# Patient Record
(demographics unavailable — no encounter records)

---

## 2024-10-30 NOTE — ASSESSMENT
[FreeTextEntry1] : 6 year girl with history that meets DSM-V criteria for diagnosis of Attention Deficit Hyperactivity Disorder-Inattentive type. I discussed behavioral modification techniques includin. Preferential seating in front of classroom and near teacher. Should be away from sources of distraction (i.e. bulletin boards, open windows/doors, facing other students) 2. Verbal, auditory or visual reminders [(i.e. different colored blocks to indicate plenty of time (green), time [facility] done (yellow) or nearing end of time(red] 3. Extra time to complete tests and projects 4. Testing in a separate room with few if any other students to reduce distraction 5. 1:1 assistance in form of paraprofessional, speech therapist and/or SEIT to help with focus 6. Homework should be completed in a quiet place without visual distraction. Recommend scheduled breaks to help alleviate the tendency to become distracted 7. Neuropsychology evaluation to r/o specific learning disorder/anxiety 8. Medication management may be indicated in the future.

## 2024-10-30 NOTE — BIRTH HISTORY
[At Term] : at term [place] [Normal Vaginal Route] : by normal vaginal route [None] : there were no delivery complications [Speech & Motor Delay] : patient has speech and motor delay  [Physical Therapy] : physical therapy [Occupational Therapy] : occupational therapy [Speech Therapy] : speech therapy [Feeding Therapy] : feeding therapy  [Age Appropriate] : age appropriate developmental milestones not met [FreeTextEntry6] : birth history not exactly known, adopted when she was 5months. [FreeTextEntry5] : hydrocephalus as an infant

## 2024-10-30 NOTE — HISTORY OF PRESENT ILLNESS
[FreeTextEntry1] : Minna presents to the office for evaluation of ADHD. She is currently in 2rd grade at [employer]. She is in an ICT classroom and has an IEP for an unspecified learning disorder. Academically, she is failing all subjects. She struggles in math and reading comprehension. She is unable to sit still for long periods of time. She is easily distracted in the classroom by her classmates and surroundings. She tends to rush through her tests and assignments and makes careless mistakes. She may have to read a question multiple times before she can answer it.  She loses focus when she is not directly engaged. Her desk is disorganized. Her backpack is disorganized. She often forgets important assignments, books, or personal belongings in school.   At home, Sugey needs extra time for homework and assignments. She needs frequent redirection from mom to complete tasks. She tends to have outbursts when she does not understand something or does not want to do something. She tends to write letters and numbers backwards. Homework takes longer than it needs to. Getting ready for school in the morning takes a long time. She needs prompting and reminding to get ready. Her room is disorganized.  She does not do chores when asked once but needs to be asked multiple times before she complies. She can be impatient and cannot wait in line for a long time. In conversation, she often forgets what she wants to say. She enjoys gymnastics. Her  has to redirect her to stay focused on tasks often.

## 2025-01-06 NOTE — REASON FOR VISIT
[Follow-Up Evaluation] : a follow-up evaluation for [ADHD] : ADHD [Patient] : patient [Parents] : parents

## 2025-01-06 NOTE — ASSESSMENT
[FreeTextEntry1] : 8 yo with hx of ADHD. Behavioral accommodations to be made in school includin. Preferential seating in front of classroom and near teacher. Should be away from sources of distraction (i.e. bulletin boards, open windows/doors, facing other students) 2. Verbal, auditory or visual reminders [(i.e. different colored blocks to indicate plenty of time (green), time [facility] done (yellow) or nearing end of time(red] 3. Extra time to complete tests and projects 4. Testing in a separate room with few if any other students to reduce distraction 5. 1:1 assistance in form of paraprofessional, speech therapist and/or SEIT to help with focus  504 forms will be completed. - medication management may be indicated in the future.

## 2025-01-06 NOTE — HISTORY OF PRESENT ILLNESS
[FreeTextEntry1] : Minna presents in follow up of her ADHD. Since last visit, behavioral modifications have been made at home including a  which has been helpful for her focus and attention. Her grades average between 2s and 3s. Accommodations have not been made in school yet. Neuropsychology appointment pending.

## 2025-05-21 NOTE — HISTORY OF PRESENT ILLNESS
[de-identified] : 7-year-old female here for an evaluation of pain to the right elbow, mom states that this pain is present for about 2 weeks, there is no witnessed trauma to the elbow, but patient is a cheerleader, she has been complaining of elbow pain after practice and at school. Mom states that at times she is waking up with pain in the elbow.

## 2025-05-21 NOTE — IMAGING
[de-identified] : On examination of the right elbow, there is no swelling, no ecchymosis, no erythema. Full range of motion to flexion, extension, pronation and supination without any pain. Good motion to flexion and extension of the elbow. Nontender over the medial or lateral epicondyle. Nontender radial head. Patient has tenderness when palpating over the olecranon. Neurovascular intact. No pain with resisted wrist flexion. No pain with resisted wrist extension. No signs of instability about the elbow.  Radiographs of the right elbow was done in office today, x-ray of the left elbow was done for comparison.  Open growth plates. No fracture or dislocation noted.On the x-ray of the right elbow there is a tiny fragmentation of the growth plate of the olecranon, this appears to be a normal finding. X-rays were consulted with Dr. Vaughan.

## 2025-05-21 NOTE — DISCUSSION/SUMMARY
[de-identified] : Impression: Stress reaction of the right olecranon  Plan: Patient was advised for no sporting activities. This time the elbow was immobilized with a Salazar dressing. No sports.  Follow-up: 1 to 2 weeks follow-up with Dr. Vaughan.

## 2025-05-23 NOTE — DISCUSSION/SUMMARY
[de-identified] : Impression: Stress reaction of the right olecranon  Plan: Patient was advised for no sporting activities. At this time I advised for a long-arm splint to the right elbow and to keep it at all times, and she can remove it for bathing.  Follow-up: 1 to 2 weeks follow-up with Dr. Vaughan.

## 2025-05-23 NOTE — HISTORY OF PRESENT ILLNESS
[de-identified] : 7-year-old female here for an evaluation of pain to the right elbow, mom states that this pain is present for about 2 weeks, there is no witnessed trauma to the elbow, but patient is a cheerleader, she has been complaining of elbow pain after practice and at school. Mom states that at times she is waking up with pain in the elbow. Patient was advised for a Salazar dressing, as per mom and patient patient continue having pain of her elbow despite the joint dressing

## 2025-05-23 NOTE — IMAGING
[de-identified] : On examination of the right elbow, there is no swelling, no ecchymosis, no erythema. Full range of motion to flexion, extension, pronation and supination without any pain. Good motion to flexion and extension of the elbow. Nontender over the medial or lateral epicondyle. Nontender radial head. Patient has tenderness when palpating over the olecranon. Neurovascular intact. No pain with resisted wrist flexion. No pain with resisted wrist extension. No signs of instability about the elbow.  Radiographs of the right elbow was done in office today, x-ray of the left elbow was done for comparison.  Open growth plates. No fracture or dislocation noted.On the x-ray of the right elbow there is a tiny fragmentation of the growth plate of the olecranon, this appears to be a normal finding. X-rays were consulted with Dr. Vaughan.

## 2025-06-10 NOTE — HISTORY OF PRESENT ILLNESS
[FreeTextEntry1] : 6 y/o female here today with right elbow pain for 4 weeks now, no trauma, experiences this pain after cheerzulily practice and school. Patient was last seen by SUDEEP Lopez on 5/23/25 and was placed in a long arm splint.

## 2025-06-10 NOTE — HISTORY OF PRESENT ILLNESS
[FreeTextEntry1] : 8 y/o female here today with right elbow pain for 4 weeks now, no trauma, experiences this pain after [employer] practice and school. Patient was last seen by SUDEEP Lopez on 5/23/25 and was placed in a long arm splint.

## 2025-06-10 NOTE — ASSESSMENT
[FreeTextEntry1] : Likely some irritation of the triceps. No gym/sports for 2 weeks. Slow return to physical activity with stretching before and after. Follow up PRN.